# Patient Record
Sex: FEMALE | Race: WHITE | Employment: PART TIME | URBAN - NONMETROPOLITAN AREA
[De-identification: names, ages, dates, MRNs, and addresses within clinical notes are randomized per-mention and may not be internally consistent; named-entity substitution may affect disease eponyms.]

---
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
## 2021-03-23 ENCOUNTER — HOSPITAL ENCOUNTER (OUTPATIENT)
Age: 21
Discharge: HOME | End: 2021-03-23

## 2021-04-27 ENCOUNTER — HOSPITAL ENCOUNTER (EMERGENCY)
Age: 21
Discharge: HOME | End: 2021-04-27
Payer: COMMERCIAL

## 2021-04-27 VITALS
DIASTOLIC BLOOD PRESSURE: 77 MMHG | HEART RATE: 130 BPM | OXYGEN SATURATION: 96 % | TEMPERATURE: 101.6 F | RESPIRATION RATE: 16 BRPM | SYSTOLIC BLOOD PRESSURE: 137 MMHG | WEIGHT: 196 LBS

## 2021-04-27 DIAGNOSIS — Z20.822 SUSPECTED COVID-19 VIRUS INFECTION: Primary | ICD-10-CM

## 2021-04-27 LAB
FLU A ANTIGEN: NEGATIVE
INFLUENZA B AG, EIA: NEGATIVE

## 2021-04-27 PROCEDURE — 99213 OFFICE O/P EST LOW 20 MIN: CPT

## 2021-04-27 PROCEDURE — 87804 INFLUENZA ASSAY W/OPTIC: CPT

## 2021-04-27 PROCEDURE — 99202 OFFICE O/P NEW SF 15 MIN: CPT | Performed by: NURSE PRACTITIONER

## 2021-04-27 RX ORDER — NORGESTIMATE AND ETHINYL ESTRADIOL 0.25-0.035
1 KIT ORAL DAILY
COMMUNITY

## 2021-04-27 RX ORDER — ASCORBIC ACID/MULTIVIT-MIN 1000 MG
EFFERVESCENT POWDER IN PACKET ORAL
Refills: 0 | COMMUNITY
Start: 2021-04-27 | End: 2021-12-30 | Stop reason: ALTCHOICE

## 2021-04-27 RX ORDER — OMEGA-3 FATTY ACIDS/FISH OIL 300-1000MG
1 CAPSULE ORAL
COMMUNITY
End: 2021-12-30

## 2021-04-27 RX ORDER — CALCIUM CARBONATE 260MG(650)
TABLET,CHEWABLE ORAL
Refills: 0 | COMMUNITY
Start: 2021-04-27 | End: 2021-12-30 | Stop reason: ALTCHOICE

## 2021-04-27 RX ORDER — TOPIRAMATE 25 MG/1
25 TABLET ORAL DAILY
COMMUNITY

## 2021-04-27 RX ORDER — ACETAMINOPHEN 325 MG/1
650 TABLET ORAL EVERY 6 HOURS PRN
COMMUNITY

## 2021-04-27 RX ORDER — CITALOPRAM 10 MG/1
10 TABLET ORAL DAILY
COMMUNITY

## 2021-04-27 ASSESSMENT — ENCOUNTER SYMPTOMS
RHINORRHEA: 0
NAUSEA: 1
SORE THROAT: 0
VOMITING: 0
COUGH: 0
DIARRHEA: 0

## 2021-04-27 ASSESSMENT — PAIN DESCRIPTION - PAIN TYPE: TYPE: ACUTE PAIN

## 2021-04-27 ASSESSMENT — PAIN DESCRIPTION - PROGRESSION: CLINICAL_PROGRESSION: GRADUALLY WORSENING

## 2021-04-27 ASSESSMENT — PAIN SCALES - GENERAL: PAINLEVEL_OUTOF10: 3

## 2021-04-27 ASSESSMENT — PAIN DESCRIPTION - ONSET: ONSET: PROGRESSIVE

## 2021-04-27 NOTE — ED PROVIDER NOTES
650 mg by mouth every 6 hours as needed for Pain    CITALOPRAM (CELEXA) 10 MG TABLET    Take 10 mg by mouth daily    IBUPROFEN (ADVIL) 200 MG CAPS    Take 1 capsule by mouth    NORGESTIMATE-ETHINYL ESTRADIOL (KARLA) 0.25-35 MG-MCG PER TABLET    Take 1 tablet by mouth daily    TOPIRAMATE (TOPAMAX) 25 MG TABLET    Take 25 mg by mouth daily       ALLERGIES     Patient is is allergic to augmentin [amoxicillin-pot clavulanate]. FAMILY HISTORY     Patient's family history is not on file. SOCIAL HISTORY     Patient  reports that she has never smoked. She has never used smokeless tobacco. She reports current alcohol use. She reports that she does not use drugs. PHYSICAL EXAM     ED TRIAGE VITALS  BP: 137/77, Temp: 101.6 °F (38.7 °C), Pulse: 130, Resp: 16, SpO2: 96 %  Physical Exam  Vitals signs and nursing note reviewed. Constitutional:       General: She is awake. She is not in acute distress. Appearance: Normal appearance. She is well-developed and well-groomed. She is not ill-appearing, toxic-appearing or diaphoretic. Interventions: She is not intubated. HENT:      Head: Normocephalic and atraumatic. Right Ear: Hearing, tympanic membrane, ear canal and external ear normal. There is no impacted cerumen. Left Ear: Hearing, tympanic membrane, ear canal and external ear normal. There is no impacted cerumen. Nose: Nose normal. No congestion or rhinorrhea. Mouth/Throat:      Lips: Pink. No lesions. Mouth: Mucous membranes are moist.      Pharynx: Oropharynx is clear. Uvula midline. No pharyngeal swelling, oropharyngeal exudate, posterior oropharyngeal erythema or uvula swelling. Tonsils: No tonsillar exudate or tonsillar abscesses. Eyes:      Extraocular Movements: Extraocular movements intact. Conjunctiva/sclera: Conjunctivae normal.   Neck:      Musculoskeletal: Normal range of motion. Cardiovascular:      Rate and Rhythm: Tachycardia present.    Pulmonary: Effort: Pulmonary effort is normal. No tachypnea, bradypnea, accessory muscle usage, prolonged expiration, respiratory distress or retractions. She is not intubated. Breath sounds: No stridor, decreased air movement or transmitted upper airway sounds. No decreased breath sounds, wheezing, rhonchi or rales. Musculoskeletal: Normal range of motion. Skin:     General: Skin is warm. Neurological:      General: No focal deficit present. Mental Status: She is alert and oriented to person, place, and time. Psychiatric:         Mood and Affect: Mood normal.         Behavior: Behavior normal. Behavior is cooperative. Thought Content: Thought content normal.         Judgment: Judgment normal.         DIAGNOSTIC RESULTS   Labs:  Results for orders placed or performed during the hospital encounter of 04/27/21   Rapid influenza A/B antigens   Result Value Ref Range    Flu A Antigen Negative NEGATIVE    Influenza B Ag, EIA Negative NEGATIVE       IMAGING:  No orders to display     URGENT CARE COURSE:     Vitals:    04/27/21 1515   BP: 137/77   Pulse: 130   Resp: 16   Temp: 101.6 °F (38.7 °C)   TempSrc: Oral   SpO2: 96%   Weight: 196 lb (88.9 kg)       Medications - No data to display  PROCEDURES:  None  FINAL IMPRESSION      1. Suspected COVID-19 virus infection        DISPOSITION/PLAN   Decision To Discharge     The patient was advised to drink plenty of fluids. They may take Tylenol for fever or body aches. Take prescribed medication as directed. They may also take OTC antihistamines/ decongestant as needed. Patient is to self isolate until COVID-19 test result is known. If applicable, work/school note has been provided to be off until COVID-19 test results. Follow with family provider for further work/school note needs. May take over-the-counter supplements daily if no contraindications:  Multivitamin/mineral, vitamin C, vitamin D, and B Complex as directed on bottles.   Pt is advised to go to ER if symptoms worsen, new symptoms develop, high fever >102, vomiting, breathing difficulty, lethargy, chest pain or shortness of breath Dial 911. The patient or patient's representative is agreeable to the treatment plan they're advised to follow-up with her primary care provider in one week for reevaluation.     PATIENT REFERRED TO:  [facility]  [address] [facility]. [address]  Call today  [phone] for appointment    [facility]  [place] [id number]  Go today  If symptoms worsen    DISCHARGE MEDICATIONS:  New Prescriptions    TOMMY, ZINGIBER OFFICINALIS, (ANTI-NAUSEA TOMMY) GUM    Follow package directions    MULTIPLE VITAMINS-MINERALS (EMERGEN-C VITAMIN C) PACK    Follow package directions    ZINC 10 MG LOZG    Follow package directions     Current Discharge Medication List          WOODROW Perdomo CNP, APRN - CNP  04/27/21 7555

## 2021-04-28 ENCOUNTER — HOSPITAL ENCOUNTER (EMERGENCY)
Age: 21
Discharge: HOME | End: 2021-04-28
Attending: EMERGENCY MEDICINE
Payer: COMMERCIAL

## 2021-04-28 ENCOUNTER — CARE COORDINATION (OUTPATIENT)
Dept: CARE COORDINATION | Age: 21
End: 2021-04-28

## 2021-04-28 ENCOUNTER — APPOINTMENT (OUTPATIENT)
Dept: GENERAL RADIOLOGY | Age: 21
End: 2021-04-28
Payer: COMMERCIAL

## 2021-04-28 VITALS
HEART RATE: 83 BPM | OXYGEN SATURATION: 97 % | RESPIRATION RATE: 14 BRPM | TEMPERATURE: 98.3 F | SYSTOLIC BLOOD PRESSURE: 124 MMHG | DIASTOLIC BLOOD PRESSURE: 68 MMHG

## 2021-04-28 DIAGNOSIS — E86.0 DEHYDRATION: ICD-10-CM

## 2021-04-28 DIAGNOSIS — B34.9 VIRAL ILLNESS: Primary | ICD-10-CM

## 2021-04-28 LAB
BACTERIA: ABNORMAL /HPF
BILIRUBIN URINE: ABNORMAL
BLOOD, URINE: NEGATIVE
CASTS 2: PRESENT /LPF
CASTS UA: ABNORMAL /LPF
CHARACTER, URINE: ABNORMAL
COLOR: ABNORMAL
CRYSTALS, UA: ABNORMAL
EPITHELIAL CELLS, UA: ABNORMAL /HPF
GLUCOSE URINE: NEGATIVE MG/DL
GROUP A STREP CULTURE, REFLEX: NEGATIVE
ICTOTEST: NEGATIVE
KETONES, URINE: 80
LEUKOCYTE ESTERASE, URINE: NEGATIVE
MISCELLANEOUS 2: ABNORMAL
NITRITE, URINE: NEGATIVE
PH UA: 6 (ref 5–9)
PREGNANCY, URINE: NEGATIVE
PROTEIN UA: 100
RBC URINE: ABNORMAL /HPF
REFLEX THROAT C + S: NORMAL
RENAL EPITHELIAL, UA: PRESENT
SPECIFIC GRAVITY, URINE: > 1.03 (ref 1–1.03)
UROBILINOGEN, URINE: 1 EU/DL (ref 0–1)
WBC UA: ABNORMAL /HPF
YEAST: ABNORMAL

## 2021-04-28 PROCEDURE — 99281 EMR DPT VST MAYX REQ PHY/QHP: CPT

## 2021-04-28 PROCEDURE — 87070 CULTURE OTHR SPECIMN AEROBIC: CPT

## 2021-04-28 PROCEDURE — 71046 X-RAY EXAM CHEST 2 VIEWS: CPT

## 2021-04-28 PROCEDURE — 81001 URINALYSIS AUTO W/SCOPE: CPT

## 2021-04-28 PROCEDURE — 87086 URINE CULTURE/COLONY COUNT: CPT

## 2021-04-28 PROCEDURE — 87880 STREP A ASSAY W/OPTIC: CPT

## 2021-04-28 PROCEDURE — 81025 URINE PREGNANCY TEST: CPT

## 2021-04-28 ASSESSMENT — ENCOUNTER SYMPTOMS
SHORTNESS OF BREATH: 0
COUGH: 0
CHEST TIGHTNESS: 0
BACK PAIN: 0
PHOTOPHOBIA: 0
EYE REDNESS: 0
EYE PAIN: 0
EYE DISCHARGE: 0
ABDOMINAL DISTENTION: 0
ABDOMINAL PAIN: 0
CONSTIPATION: 0
SORE THROAT: 0
DIARRHEA: 0
RHINORRHEA: 0
STRIDOR: 0
EYE ITCHING: 0
NAUSEA: 0
WHEEZING: 0
VOMITING: 0

## 2021-04-28 NOTE — ED PROVIDER NOTES
[address] ENCOUNTER      PATIENT NAME: Clemente Yanes  MRN: [id number]  : 2000  ELENA: 2021  PROVIDER: Brook Weston MD      CHIEF COMPLAINT       Chief Complaint   Patient presents with    Fever       Nurses Notes reviewed and I agree except as noted in the HPI. HISTORY OF PRESENT ILLNESS    Clemente Yanes is a 24 y.o. female who presents to Emergency Department with Fever    80-year-old female who is healthy presents with fever for the last 3 days. Highest temperature 103-104 F. Patient was seen at urgent care center yesterday, flu and Covid-19 swabs were negative. Patient today ran fever around 104, she took ibuprofen and Tylenol at home. She is currently afebrile. She feels fatigued. Otherwise no cough, no chest pain, no shortness of breath. No abdominal pain. No nausea, no vomiting, no urinary symptoms. This HPI was provided by the patient. REVIEW OF SYSTEMS     Review of Systems   Constitutional: Positive for fever. Negative for activity change, appetite change, chills, fatigue and unexpected weight change. HENT: Negative for congestion, ear discharge, ear pain, hearing loss, nosebleeds, rhinorrhea and sore throat. Eyes: Negative for photophobia, pain, discharge, redness and itching. Respiratory: Negative for cough, chest tightness, shortness of breath, wheezing and stridor. Cardiovascular: Negative for chest pain, palpitations and leg swelling. Gastrointestinal: Negative for abdominal distention, abdominal pain, constipation, diarrhea, nausea and vomiting. Endocrine: Negative for cold intolerance, heat intolerance, polydipsia and polyphagia. Genitourinary: Negative for dysuria, flank pain, frequency and hematuria. Musculoskeletal: Negative for arthralgias, back pain, gait problem, myalgias, neck pain and neck stiffness. Skin: Negative for pallor, rash and wound.    Allergic/Immunologic: Negative for environmental allergies and food allergies. Neurological: Negative for dizziness, tremors, syncope, weakness and headaches. Psychiatric/Behavioral: Negative for agitation, behavioral problems, confusion, self-injury, sleep disturbance and suicidal ideas. PAST MEDICAL HISTORY     Past Medical History:   Diagnosis Date    Asthma     Pneumonia        SURGICAL HISTORY       Past Surgical History:   Procedure Laterality Date    APPENDECTOMY         CURRENT MEDICATIONS       Previous Medications    ACETAMINOPHEN (TYLENOL) 325 MG TABLET    Take 650 mg by mouth every 6 hours as needed for Pain    CITALOPRAM (CELEXA) 10 MG TABLET    Take 10 mg by mouth daily    GINGER, ZINGIBER OFFICINALIS, (ANTI-NAUSEA GINGER) GUM    Follow package directions    IBUPROFEN (ADVIL) 200 MG CAPS    Take 1 capsule by mouth    MULTIPLE VITAMINS-MINERALS (EMERGEN-C VITAMIN C) PACK    Follow package directions    NORGESTIMATE-ETHINYL ESTRADIOL (KARLA) 0.25-35 MG-MCG PER TABLET    Take 1 tablet by mouth daily    TOPIRAMATE (TOPAMAX) 25 MG TABLET    Take 25 mg by mouth daily    ZINC 10 MG LOZG    Follow package directions       ALLERGIES     Augmentin [amoxicillin-pot clavulanate]    FAMILY HISTORY     has no family status information on file. family history is not on file. SOCIAL HISTORY      reports that she has never smoked. She has never used smokeless tobacco. She reports current alcohol use. She reports that she does not use drugs. PHYSICAL EXAM     INITIAL VITALS:    oral temperature is 98.3 °F (36.8 °C). Her blood pressure is 124/68 and her pulse is 83. Her respiration is 14 and oxygen saturation is 97%. Physical Exam  Vitals signs and nursing note reviewed. Constitutional:       Appearance: She is well-developed. She is not diaphoretic. HENT:      Head: Normocephalic and atraumatic. Nose: Nose normal.   Eyes:      General: No scleral icterus. Right eye: No discharge. Left eye: No discharge. supportive care including rest drinking of water alternating Tylenol ibuprofen as needed and close PCP follow-up. Patient discharged in stable conditions. CRITICAL CARE:   None    CONSULTS:  None    PROCEDURES:  None    RE-EXAMINATION AND RE-EVALUATION   Stable and feeling better. VITALS IN ED  Vitals:    04/28/21 1513 04/28/21 1515   BP:  124/68   Pulse:  83   Resp:  14   Temp: 98.3 °F (36.8 °C)    TempSrc: Oral    SpO2:  97%       FINAL IMPRESSION      1. Viral illness    2.  Dehydration          DISPOSITION/PLAN   Discharge home    PATIENT REFERRED TO:  Babs Carey  [address] [facility] [address]  [phone]    In 3 days  ED discharge follow-up      DISCHARGE MEDICATIONS:  New Prescriptions    No medications on file       (Please note that portions of this note were completed with a voice recognition program.  Efforts were made to edit the dictations but occasionally words aremis-transcribed.)    MD Galdys Frost MD  04/28/21 7178

## 2021-04-28 NOTE — CARE COORDINATION
Patient contacted regarding recent visit for viral symptoms.  contacted the patient by telephone to perform post discharge call. Verified name and  with patient as identifiers. Provided introduction to self, and reason for call due to viral symptoms of infection and/or exposure to COVID-19. Call within 2 business days of discharge: Yes       Patient presented to emergency department/flu clinic with complaints of viral symptoms/exposure to COVID. Patient reports symptoms are the same. Due to no new or worsening symptoms the RN CTN/SAMINA was not notified for escalation. Discussed exposure protocols and quarantine with CDC Guidelines What To Do If You Are Sick    Patient was given an opportunity for questions and concerns. Stay home except to get medical care    Separate yourself from other people and animals in your home    Call ahead before visiting your doctor    Wear a facemask    Cover your coughs and sneezes    Clean your hands often    Avoid sharing personal household items    Clean all high-touch surfaces everyday    Monitor your symptoms  Seek prompt medical attention if your illness is worsening (e.g., difficulty breathing). Before seeking care, call your healthcare provider and tell them that you have, or are being evaluated for, COVID-19. Put on a facemask before you enter the facility. These steps will help the healthcare provider's office to keep other people in the office or waiting room from getting infected or exposed. Ask your healthcare provider to call the local or Critical access hospital health department. Persons who are placed under If you have a medical emergency and need to call 911, notify the dispatch personnel that you have, or are being evaluated for COVID-19. If possible, put on a facemask before emergency medical services arrive.     The patient agrees to contact the [facility] exposure line [phone], local health department [place] [facility] of [facility]: ([phone]) and PCP

## 2021-04-28 NOTE — ED TRIAGE NOTES
Patient presents for concerns of fever. States she took 400mg of ibuprofen at 8am this morning and 500mg of tylenol at 130pm. States her thermometer reached 105. No fever noted at this time.

## 2021-04-29 ENCOUNTER — CARE COORDINATION (OUTPATIENT)
Dept: CARE COORDINATION | Age: 21
End: 2021-04-29

## 2021-04-29 LAB
ORGANISM: ABNORMAL
URINE CULTURE REFLEX: ABNORMAL

## 2021-04-29 NOTE — CARE COORDINATION
Attempted to reach patient for covid-19 f/u s/p recent ED visit for c/o fever and ongoing symptoms. COVID-19 testing was not completed. Patient previously called for COVID-19 education and enrolled in LOOP program.  Patient was not available at the time of my call and no voicemail option available. No further f/u planned at this time.

## 2021-04-30 ENCOUNTER — HOSPITAL ENCOUNTER (EMERGENCY)
Age: 21
Discharge: HOME | End: 2021-04-30
Attending: EMERGENCY MEDICINE
Payer: COMMERCIAL

## 2021-04-30 VITALS
HEIGHT: 66 IN | HEART RATE: 108 BPM | WEIGHT: 190 LBS | RESPIRATION RATE: 18 BRPM | OXYGEN SATURATION: 97 % | DIASTOLIC BLOOD PRESSURE: 67 MMHG | BODY MASS INDEX: 30.53 KG/M2 | SYSTOLIC BLOOD PRESSURE: 111 MMHG | TEMPERATURE: 99.1 F

## 2021-04-30 DIAGNOSIS — B09 VIRAL EXANTHEM: Primary | ICD-10-CM

## 2021-04-30 DIAGNOSIS — R11.0 NAUSEA: ICD-10-CM

## 2021-04-30 DIAGNOSIS — B34.9 VIRAL ILLNESS: ICD-10-CM

## 2021-04-30 LAB
ANION GAP SERPL CALCULATED.3IONS-SCNC: 11 MEQ/L (ref 8–16)
BUN BLDV-MCNC: 10 MG/DL (ref 7–22)
CALCIUM SERPL-MCNC: 8.6 MG/DL (ref 8.5–10.5)
CHLORIDE BLD-SCNC: 100 MEQ/L (ref 98–111)
CO2: 24 MEQ/L (ref 23–33)
CREAT SERPL-MCNC: 0.9 MG/DL (ref 0.4–1.2)
GFR SERPL CREATININE-BSD FRML MDRD: 79 ML/MIN/1.73M2
GLUCOSE BLD-MCNC: 106 MG/DL (ref 70–108)
HETEROPHILE ANTIBODIES: NEGATIVE
OSMOLALITY CALCULATION: 269.6 MOSMOL/KG (ref 275–300)
POTASSIUM SERPL-SCNC: 3.4 MEQ/L (ref 3.5–5.2)
SARS-COV-2, NAAT: NOT DETECTED
SCAN OF BLOOD SMEAR: NORMAL
SODIUM BLD-SCNC: 135 MEQ/L (ref 135–145)
THROAT/NOSE CULTURE: NORMAL

## 2021-04-30 PROCEDURE — 86308 HETEROPHILE ANTIBODY SCREEN: CPT

## 2021-04-30 PROCEDURE — 36415 COLL VENOUS BLD VENIPUNCTURE: CPT

## 2021-04-30 PROCEDURE — 99282 EMERGENCY DEPT VISIT SF MDM: CPT

## 2021-04-30 PROCEDURE — 80048 BASIC METABOLIC PNL TOTAL CA: CPT

## 2021-04-30 PROCEDURE — 2580000003 HC RX 258: Performed by: STUDENT IN AN ORGANIZED HEALTH CARE EDUCATION/TRAINING PROGRAM

## 2021-04-30 PROCEDURE — 87635 SARS-COV-2 COVID-19 AMP PRB: CPT

## 2021-04-30 PROCEDURE — 96360 HYDRATION IV INFUSION INIT: CPT

## 2021-04-30 PROCEDURE — 85025 COMPLETE CBC W/AUTO DIFF WBC: CPT

## 2021-04-30 RX ORDER — ONDANSETRON 4 MG/1
4 TABLET, ORALLY DISINTEGRATING ORAL ONCE
Status: DISCONTINUED | OUTPATIENT
Start: 2021-04-30 | End: 2021-04-30

## 2021-04-30 RX ORDER — 0.9 % SODIUM CHLORIDE 0.9 %
1000 INTRAVENOUS SOLUTION INTRAVENOUS ONCE
Status: COMPLETED | OUTPATIENT
Start: 2021-04-30 | End: 2021-04-30

## 2021-04-30 RX ORDER — ONDANSETRON 4 MG/1
4 TABLET, ORALLY DISINTEGRATING ORAL 3 TIMES DAILY PRN
Qty: 21 TABLET | Refills: 0 | Status: SHIPPED | OUTPATIENT
Start: 2021-04-30 | End: 2021-12-30 | Stop reason: ALTCHOICE

## 2021-04-30 RX ADMIN — SODIUM CHLORIDE 1000 ML: 9 INJECTION, SOLUTION INTRAVENOUS at 17:37

## 2021-04-30 ASSESSMENT — ENCOUNTER SYMPTOMS
RHINORRHEA: 0
NAUSEA: 1
CHEST TIGHTNESS: 0
SINUS PRESSURE: 0
VOMITING: 1
CONSTIPATION: 0
DIARRHEA: 1
SHORTNESS OF BREATH: 0
ABDOMINAL DISTENTION: 0
EYE PAIN: 0
SINUS PAIN: 0
EYE ITCHING: 0

## 2021-04-30 NOTE — ED PROVIDER NOTES
[address] [facility] [address]        Pt Name: Nydia May  MRN: [id number]  [place] 2000  Date of evaluation: 4/30/2021  Treating Resident Physician: Guero Armenta DO  Supervising Physician: [address] [facility]       Chief Complaint   Patient presents with    Fever    Rash     History obtained from the patient. HISTORY OF PRESENT ILLNESS    HPI  Nydia May is a 24 y.o. female who presents to the emergency department for evaluation of approximately 1 week of high fevers. Seen in urgent care and this emergency department for viral illness. Return secondary to persistent fevers T-max 104. New abdominal pain, labs that are nonbloody nonbilious vomiting and development of rash on her abdomen. PCP wanted her evaluated to make sure she does not have meningitis. Patient is also very nervous about her exams that are this month. She is fully vaccinated college student. Denies sore throat, current nausea, abdominal pain, difficulty urinating or changes in vaginal discharge. No chest pain or shortness of breath. The patient has no other acute complaints at this time. REVIEW OF SYSTEMS   Review of Systems   Constitutional: Positive for fever. Negative for activity change, chills and diaphoresis. HENT: Positive for ear pain. Negative for congestion, rhinorrhea, sinus pressure and sinus pain. Eyes: Negative for pain and itching. Respiratory: Negative for chest tightness and shortness of breath. Cardiovascular: Negative for chest pain and palpitations. Gastrointestinal: Positive for diarrhea, nausea and vomiting. Negative for abdominal distention and constipation. Genitourinary: Negative for difficulty urinating and dysuria. Musculoskeletal: Negative for neck pain and neck stiffness. Skin: Positive for rash. Negative for wound. Neurological: Negative for dizziness, weakness, light-headedness and numbness. present. Neurological:      General: No focal deficit present. Mental Status: She is alert and oriented to person, place, and time. MEDICAL DECISION MAKING   Initial Assessment:   1. Pleasant 44-year-old female with history of recent viral illness. New onset of symptoms that include nausea, vomiting, rash. Concern for worsening symptoms. Continued fever controlled with NSAID and Tylenol. Feels dry dehydrated. Vital signs stable with mild tachycardia, no fever currently, no hypoxia. No hypotension. Viral exanthem covering the trunk and scattered aspects of the upper extremities. No petechia or purpura  Plan:    CBC, BMP to check for electrolyte abnormality or white count/or RBC abnormality. Monospot. Recheck Covid. IV hydration, nausea control Zofran. Follow-up with family medicine resident clinic. Patient agreeable this plan to discharge home with close outpatient follow-up.     ED RESULTS   Laboratory results:  Labs Reviewed   CBC WITH AUTO DIFFERENTIAL - Abnormal; Notable for the following components:       Result Value    WBC 3.9 (*)     RDW-SD 45.1 (*)     All other components within normal limits   BASIC METABOLIC PANEL - Abnormal; Notable for the following components:    Potassium 3.4 (*)     All other components within normal limits   GLOMERULAR FILTRATION RATE, ESTIMATED - Abnormal; Notable for the following components:    Est, Glom Filt Rate 79 (*)     All other components within normal limits   OSMOLALITY - Abnormal; Notable for the following components:    Osmolality Calc 269.6 (*)     All other components within normal limits   COVID-19, RAPID   MONONUCLEOSIS SCREEN   ANION GAP   SCAN OF BLOOD SMEAR       Radiologic studies results:  No orders to display       ED Medications administered this visit:   Medications   0.9 % sodium chloride bolus (1,000 mLs Intravenous New Bag 4/30/21 1524)         ED COURSE          Strict return precautions and follow up instructions were

## 2021-04-30 NOTE — ED PROVIDER NOTES

## 2021-04-30 NOTE — ED NOTES
Pt presents to the ed with c/o rash and fever. PT states that she was seen here earlier in the week and sent home, patient has not seen any relief. Pt now presenting with a generalized rash. Last took tylenol for fever at 1530 for 102 fever.       Conroy [place], [place]  96/09/79 7880

## 2021-05-01 LAB
ATYPICAL LYMPHOCYTES: ABNORMAL %
BASOPHILS # BLD: 0.3 %
BASOPHILS ABSOLUTE: 0 THOU/MM3 (ref 0–0.1)
DIFFERENTIAL TYPE: ABNORMAL
EOSINOPHIL # BLD: 0 %
EOSINOPHILS ABSOLUTE: 0 THOU/MM3 (ref 0–0.4)
ERYTHROCYTE [DISTWIDTH] IN BLOOD BY AUTOMATED COUNT: 13.7 % (ref 11.5–14.5)
ERYTHROCYTE [DISTWIDTH] IN BLOOD BY AUTOMATED COUNT: 45.1 FL (ref 35–45)
HCT VFR BLD CALC: 41 % (ref 37–47)
HEMOGLOBIN: 13.5 GM/DL (ref 12–16)
IMMATURE GRANS (ABS): 0.01 THOU/MM3 (ref 0–0.07)
IMMATURE GRANULOCYTES: 0.3 %
LYMPHOCYTES # BLD: 35.8 %
LYMPHOCYTES ABSOLUTE: 1.4 THOU/MM3 (ref 1–4.8)
MCH RBC QN AUTO: 29.6 PG (ref 26–33)
MCHC RBC AUTO-ENTMCNC: 32.9 GM/DL (ref 32.2–35.5)
MCV RBC AUTO: 89.9 FL (ref 81–99)
MONOCYTES # BLD: 2.3 %
MONOCYTES ABSOLUTE: 0.1 THOU/MM3 (ref 0.4–1.3)
NUCLEATED RED BLOOD CELLS: 0 /100 WBC
PATHOLOGIST REVIEW: ABNORMAL
PLATELET # BLD: 167 THOU/MM3 (ref 130–400)
PMV BLD AUTO: 10.4 FL (ref 9.4–12.4)
RBC # BLD: 4.56 MILL/MM3 (ref 4.2–5.4)
SEG NEUTROPHILS: 61.3 %
SEGMENTED NEUTROPHILS ABSOLUTE COUNT: 2.4 THOU/MM3 (ref 1.8–7.7)
WBC # BLD: 3.9 THOU/MM3 (ref 4.8–10.8)

## 2021-09-23 ENCOUNTER — NURSE ONLY (OUTPATIENT)
Dept: LAB | Age: 21
End: 2021-09-23

## 2021-10-16 ENCOUNTER — HOSPITAL ENCOUNTER (EMERGENCY)
Age: 21
Discharge: HOME | End: 2021-10-16
Payer: COMMERCIAL

## 2021-10-16 VITALS
DIASTOLIC BLOOD PRESSURE: 62 MMHG | HEIGHT: 66 IN | RESPIRATION RATE: 18 BRPM | HEART RATE: 82 BPM | OXYGEN SATURATION: 98 % | SYSTOLIC BLOOD PRESSURE: 132 MMHG | WEIGHT: 195 LBS | BODY MASS INDEX: 31.34 KG/M2 | TEMPERATURE: 97.1 F

## 2021-10-16 DIAGNOSIS — J01.00 ACUTE MAXILLARY SINUSITIS, RECURRENCE NOT SPECIFIED: Primary | ICD-10-CM

## 2021-10-16 PROCEDURE — 99213 OFFICE O/P EST LOW 20 MIN: CPT

## 2021-10-16 PROCEDURE — 99213 OFFICE O/P EST LOW 20 MIN: CPT | Performed by: NURSE PRACTITIONER

## 2021-10-16 RX ORDER — CEFDINIR 300 MG/1
300 CAPSULE ORAL 2 TIMES DAILY
Qty: 14 CAPSULE | Refills: 0 | Status: SHIPPED | OUTPATIENT
Start: 2021-10-16 | End: 2021-10-16 | Stop reason: SDUPTHER

## 2021-10-16 RX ORDER — PSEUDOEPHEDRINE HYDROCHLORIDE 30 MG/1
30 TABLET ORAL EVERY 4 HOURS PRN
Qty: 56 TABLET | Refills: 0 | COMMUNITY
Start: 2021-10-16 | End: 2021-10-16 | Stop reason: SDUPTHER

## 2021-10-16 RX ORDER — PSEUDOEPHEDRINE HYDROCHLORIDE 30 MG/1
30 TABLET ORAL EVERY 4 HOURS PRN
Qty: 56 TABLET | Refills: 0 | COMMUNITY
Start: 2021-10-16 | End: 2021-10-30

## 2021-10-16 RX ORDER — CEFDINIR 300 MG/1
300 CAPSULE ORAL 2 TIMES DAILY
Qty: 14 CAPSULE | Refills: 0 | Status: SHIPPED | OUTPATIENT
Start: 2021-10-16 | End: 2021-10-23

## 2021-10-16 ASSESSMENT — ENCOUNTER SYMPTOMS
CHEST TIGHTNESS: 0
SWOLLEN GLANDS: 0
STRIDOR: 0
APNEA: 0
SHORTNESS OF BREATH: 0
SINUS PRESSURE: 1
RHINORRHEA: 1
WHEEZING: 0
CHOKING: 0
SINUS PAIN: 1
SORE THROAT: 0
COUGH: 1

## 2021-10-16 NOTE — ED PROVIDER NOTES
[place] [address]  Urgent Care Encounter      CHIEF COMPLAINT       Chief Complaint   Patient presents with    Cough    Otalgia       Nurses Notes reviewed and I agree except as noted in the HPI. HISTORY OFPRESENT ILLNESS   Jamal Owens is a 24 y.o. The history is provided by the patient. No  was used. URI  Presenting symptoms: congestion, cough, ear pain, fatigue and rhinorrhea    Presenting symptoms: no facial pain, no fever and no sore throat    Severity:  Moderate  Onset quality:  Gradual  Duration:  2 weeks  Timing:  Constant  Progression:  Unchanged  Chronicity:  New  Relieved by:  Nothing  Worsened by:  Nothing  Ineffective treatments:  OTC medications, hot fluids and rest  Associated symptoms: headaches and sinus pain    Associated symptoms: no arthralgias, no myalgias, no neck pain, no sneezing, no swollen glands and no wheezing    Risk factors: not elderly, no chronic cardiac disease, no chronic kidney disease, no chronic respiratory disease, no diabetes mellitus, no immunosuppression, no recent illness, no recent travel and no sick contacts        REVIEW OF SYSTEMS     Review of Systems   Constitutional: Positive for fatigue. Negative for activity change, appetite change, chills, diaphoresis and fever. HENT: Positive for congestion, ear pain, postnasal drip, rhinorrhea, sinus pressure and sinus pain. Negative for sneezing and sore throat. Respiratory: Positive for cough. Negative for apnea, choking, chest tightness, shortness of breath, wheezing and stridor. Cardiovascular: Negative for chest pain, palpitations and leg swelling. Musculoskeletal: Negative for arthralgias, myalgias and neck pain. Neurological: Positive for headaches. Negative for dizziness and light-headedness.        PAST MEDICAL HISTORY         Diagnosis Date    Asthma     Pneumonia        SURGICAL HISTORY     Patient  has a past surgical history that includes Appendectomy. CURRENT MEDICATIONS       Discharge Medication List as of 10/16/2021 10:49 AM      CONTINUE these medications which have NOT CHANGED    Details   ondansetron (ZOFRAN-ODT) 4 MG disintegrating tablet Take 1 tablet by mouth 3 times daily as needed for Nausea or Vomiting, Disp-21 tablet, R-0Normal      topiramate (TOPAMAX) 25 MG tablet Take 25 mg by mouth dailyHistorical Med      norgestimate-ethinyl estradiol (KARLA) 0.25-35 MG-MCG per tablet Take 1 tablet by mouth dailyHistorical Med      citalopram (CELEXA) 10 MG tablet Take 10 mg by mouth dailyHistorical Med      acetaminophen (TYLENOL) 325 MG tablet Take 650 mg by mouth every 6 hours as needed for PainHistorical Med      ibuprofen (ADVIL) 200 MG CAPS Take 1 capsule by mouthHistorical Med      Multiple Vitamins-Minerals (EMERGEN-C VITAMIN C) PACK Follow package directions, R-0OTC      Zinc 10 MG LOZG Follow package directions, R-0OTC      Ginger, Zingiber officinalis, (ANTI-NAUSEA GINGER) GUM Follow package directions, R-0OTC             ALLERGIES     Patient is is allergic to augmentin [amoxicillin-pot clavulanate]. FAMILY HISTORY     Patient's family history is not on file. SOCIAL HISTORY     Patient  reports that she has never smoked. She has never used smokeless tobacco. She reports current alcohol use. She reports that she does not use drugs. PHYSICAL EXAM     ED TRIAGE VITALS  BP: 132/62, Temp: 97.1 °F (36.2 °C), Pulse: 82, Resp: 18, SpO2: 98 %  Physical Exam  Vitals and nursing note reviewed. Constitutional:       General: She is awake. She is not in acute distress. Appearance: Normal appearance. She is normal weight. She is not ill-appearing, toxic-appearing or diaphoretic. HENT:      Head: Normocephalic and atraumatic. Right Ear: Hearing, tympanic membrane, ear canal and external ear normal.      Left Ear: Hearing, tympanic membrane, ear canal and external ear normal.      Nose:      Right Nostril: Occlusion present. Left Nostril: Occlusion present. Right Sinus: Maxillary sinus tenderness present. Left Sinus: Maxillary sinus tenderness present. Mouth/Throat:      Mouth: Mucous membranes are moist.   Eyes:      Extraocular Movements: Extraocular movements intact. Conjunctiva/sclera: Conjunctivae normal.   Pulmonary:      Effort: Pulmonary effort is normal.   Musculoskeletal:         General: Normal range of motion. Cervical back: Normal range of motion. Skin:     General: Skin is warm. Neurological:      General: No focal deficit present. Mental Status: She is alert and oriented to person, place, and time. Psychiatric:         Mood and Affect: Mood normal.         Behavior: Behavior normal. Behavior is cooperative. Thought Content: Thought content normal.         Judgment: Judgment normal.         DIAGNOSTIC RESULTS   Labs:No results found for this visit on 10/16/21. IMAGING:  No orders to display     URGENT CARE COURSE:     Vitals:    10/16/21 1030   BP: 132/62   Pulse: 82   Resp: 18   Temp: 97.1 °F (36.2 °C)   TempSrc: Tympanic   SpO2: 98%   Weight: 195 lb (88.5 kg)   Height: 5' 6\" (1.676 m)       Medications - No data to display  PROCEDURES:  None  FINAL IMPRESSION      1. Acute maxillary sinusitis, recurrence not specified        DISPOSITION/PLAN   Decision To Discharge    Drink lots of fluids  Take Motrin or Tylenol for headache  Humidification of air  Use nasal spray as directed  Take a nasal decongestant as directed  Monitor for any fever increased and sinus pain or pressure  Follow-up see her primary care provider in 48 hours  Or go to the emergency department for any changes or concerns.       PATIENT REFERRED TO:  Hudson Tsang  [address] [place] [address]  [phone]      As needed    DISCHARGE MEDICATIONS:  Discharge Medication List as of 10/16/2021 10:49 AM        Discharge Medication List as of 10/16/2021 10:49 AM      CONTINUE these medications which have CHANGED    Details   pseudoephedrine (SUDAFED) 30 MG tablet Take 1 tablet by mouth every 4 hours as needed for Congestion, Disp-56 tablet, R-0OT      cefdinir (OMNICEF) 300 MG capsule Take 1 capsule by mouth 2 times daily for 7 days, Disp-14 capsule, RSimone0Normal             WOODROW Ruiz CNP, APRN - CNP  10/16/21 1114

## 2021-10-16 NOTE — ED NOTES
Patient presents to [facility] with complaints of bilateral ear fullness and a cough since October 4th.  Patient reports being COVID swabbed last weekend with a negative test. Patient denies any pain     Candice Litten, RN  10/16/21 1037

## 2021-12-30 ENCOUNTER — HOSPITAL ENCOUNTER (EMERGENCY)
Age: 21
Discharge: HOME | End: 2021-12-30
Payer: COMMERCIAL

## 2021-12-30 VITALS
HEIGHT: 66 IN | TEMPERATURE: 98.8 F | OXYGEN SATURATION: 96 % | DIASTOLIC BLOOD PRESSURE: 93 MMHG | RESPIRATION RATE: 18 BRPM | WEIGHT: 195 LBS | SYSTOLIC BLOOD PRESSURE: 143 MMHG | BODY MASS INDEX: 31.34 KG/M2 | HEART RATE: 86 BPM

## 2021-12-30 DIAGNOSIS — R52 BODY ACHES: Primary | ICD-10-CM

## 2021-12-30 PROCEDURE — 87636 SARSCOV2 & INF A&B AMP PRB: CPT

## 2021-12-30 PROCEDURE — 99213 OFFICE O/P EST LOW 20 MIN: CPT | Performed by: NURSE PRACTITIONER

## 2021-12-30 PROCEDURE — 99213 OFFICE O/P EST LOW 20 MIN: CPT

## 2021-12-30 ASSESSMENT — PAIN SCALES - GENERAL: PAINLEVEL_OUTOF10: 3

## 2021-12-30 ASSESSMENT — PAIN DESCRIPTION - DESCRIPTORS: DESCRIPTORS: ACHING

## 2021-12-30 ASSESSMENT — PAIN DESCRIPTION - ONSET: ONSET: ON-GOING

## 2021-12-30 ASSESSMENT — PAIN DESCRIPTION - FREQUENCY: FREQUENCY: CONTINUOUS

## 2021-12-30 ASSESSMENT — PAIN DESCRIPTION - LOCATION: LOCATION: NECK

## 2021-12-30 ASSESSMENT — PAIN DESCRIPTION - PAIN TYPE: TYPE: ACUTE PAIN

## 2021-12-30 NOTE — Clinical Note
Krista Morrow was seen and treated in our emergency department on 12/30/2021. She may return to work on 12/31/2021. Covid test is pending. If you have any questions or concerns, please don't hesitate to call.       Dean Stevens, WOODROW - CNP

## 2021-12-31 LAB
INFLUENZA A: NOT DETECTED
INFLUENZA B: NOT DETECTED
SARS-COV-2 RNA, RT PCR: NOT DETECTED

## 2021-12-31 NOTE — ED NOTES
Pt discharged. Pt verbalized understanding of discharge instructions and scripts. Pt walked out per self in stable condition.      Rogelio Jo, DAVID  87/99/49 [id number]

## 2021-12-31 NOTE — ED TRIAGE NOTES
To room with request for covid test. Headache and body aches that started today[facility]

## 2022-01-03 ASSESSMENT — ENCOUNTER SYMPTOMS
COUGH: 0
EYE REDNESS: 0
NAUSEA: 0
VOMITING: 0
EYE DISCHARGE: 0
DIARRHEA: 0
SHORTNESS OF BREATH: 0
RHINORRHEA: 0
SORE THROAT: 0
TROUBLE SWALLOWING: 0

## 2022-01-03 NOTE — ED PROVIDER NOTES
[address] [facility] Encounter      CHIEFCOMPLAINT       Chief Complaint   Patient presents with    Generalized Body Aches       Nurses Notes reviewed and I agree except as noted in the HPI. HISTORY OF PRESENT ILLNESS   Ronnie Rock is a 24 y.o. female who presents for evaluation of COVID-19 symptoms. Symptoms began earlier today. She complains of generalized body aches, rates 3/10. Associated generalized headache. No fever or loss of taste/smell. No chest pain or shortness of breath. Patient exposed to COVID-19. REVIEW OF SYSTEMS     Review of Systems   Constitutional: Negative for chills, diaphoresis, fatigue and fever. HENT: Negative for congestion, ear pain, rhinorrhea, sore throat and trouble swallowing. Eyes: Negative for discharge and redness. Respiratory: Negative for cough and shortness of breath. Cardiovascular: Negative for chest pain. Gastrointestinal: Negative for diarrhea, nausea and vomiting. Genitourinary: Negative for decreased urine volume. Musculoskeletal: Positive for myalgias. Negative for neck pain and neck stiffness. Skin: Negative for rash. Neurological: Positive for headaches. Hematological: Negative for adenopathy. Psychiatric/Behavioral: Negative for sleep disturbance. PAST MEDICAL HISTORY         Diagnosis Date    Asthma     Pneumonia        SURGICAL HISTORY     Patient  has a past surgical history that includes Appendectomy.     CURRENT MEDICATIONS       Discharge Medication List as of 12/30/2021  8:05 PM      CONTINUE these medications which have NOT CHANGED    Details   topiramate (TOPAMAX) 25 MG tablet Take 25 mg by mouth dailyHistorical Med      citalopram (CELEXA) 10 MG tablet Take 10 mg by mouth dailyHistorical Med      acetaminophen (TYLENOL) 325 MG tablet Take 650 mg by mouth every 6 hours as needed for PainHistorical Med      norgestimate-ethinyl estradiol (KARLA) 0.25-35 MG-MCG per tablet Take 1 tablet by mouth dailyHistorical Med      Nikki, Zingiber officinalis, (ANTI-NAUSEA NIKKI) GUM Follow package directions, R-0OTC             ALLERGIES     Patient is is allergic to augmentin [amoxicillin-pot clavulanate]. FAMILY HISTORY     Patient'sfamily history is not on file. SOCIAL HISTORY     Patient  reports that she has never smoked. She has never used smokeless tobacco. She reports current alcohol use. She reports that she does not use drugs. PHYSICAL EXAM     ED TRIAGE VITALS  BP: (!) 143/93, Temp: 98.8 °F (37.1 °C), Pulse: 86, Resp: 18, SpO2: 96 %  Physical Exam  Vitals and nursing note reviewed. Constitutional:       General: She is not in acute distress. Appearance: Normal appearance. She is well-developed. She is not ill-appearing, toxic-appearing or diaphoretic. HENT:      Head: Normocephalic and atraumatic. Right Ear: Hearing, tympanic membrane, ear canal and external ear normal. No mastoid tenderness. No hemotympanum. Tympanic membrane is not perforated, erythematous or bulging. Left Ear: Hearing, tympanic membrane, ear canal and external ear normal. No mastoid tenderness. No hemotympanum. Tympanic membrane is not perforated, erythematous or bulging. Nose: Nose normal.      Mouth/Throat:      Mouth: Mucous membranes are moist.      Pharynx: Oropharynx is clear. Uvula midline. Tonsils: No tonsillar abscesses. Eyes:      General: No scleral icterus. Conjunctiva/sclera: Conjunctivae normal.      Right eye: Right conjunctiva is not injected. No hemorrhage. Left eye: Left conjunctiva is not injected. No hemorrhage. Neck:      Thyroid: No thyromegaly. Trachea: Trachea normal.   Cardiovascular:      Rate and Rhythm: Normal rate and regular rhythm. No extrasystoles are present. Chest Wall: PMI is not displaced. Heart sounds: Normal heart sounds. No murmur heard. No friction rub. No gallop.     Pulmonary:      Effort: Pulmonary effort is normal. No respiratory distress. Breath sounds: Normal breath sounds. Chest:   Breasts:      Right: No supraclavicular adenopathy. Left: No supraclavicular adenopathy. Musculoskeletal:      Cervical back: Normal range of motion and neck supple. Lymphadenopathy:      Head:      Right side of head: No submental, submandibular, tonsillar or occipital adenopathy. Left side of head: No submental, submandibular, tonsillar or occipital adenopathy. Cervical: No cervical adenopathy. Upper Body:      Right upper body: No supraclavicular adenopathy. Left upper body: No supraclavicular adenopathy. Skin:     General: Skin is warm and dry. Capillary Refill: Capillary refill takes less than 2 seconds. Coloration: Skin is not pale. Findings: No rash. Comments: Skin warm and dry to touch, no rashes noted on exposed surfaces. Neurological:      Mental Status: She is alert and oriented to person, place, and time. She is not disoriented. Psychiatric:         Mood and Affect: Mood normal.         Behavior: Behavior is cooperative. DIAGNOSTIC RESULTS   Labs:   Results for orders placed or performed during the hospital encounter of 12/30/21   COVID-19 & Influenza Combo    Specimen: Nasopharyngeal Swab   Result Value Ref Range    SARS-CoV-2 RNA, RT PCR NOT DETECTED NOT DETECTED    INFLUENZA A NOT DETECTED NOT DETECTED    INFLUENZA B NOT DETECTED NOT DETECTED       IMAGING:  No orders to display     URGENT CARE COURSE:     Vitals:    12/30/21 1959   BP: (!) 143/93   Pulse: 86   Resp: 18   Temp: 98.8 °F (37.1 °C)   TempSrc: Temporal   SpO2: 96%   Weight: 195 lb (88.5 kg)   Height: 5' 6\" (1.676 m)       Medications - No data to display  PROCEDURES:  None  FINALIMPRESSION      1. Body aches        DISPOSITION/PLAN   DISPOSITION Decision To Discharge 12/30/2021 08:04:43 PM  Covid/influenza negative. Over-the-counter medicine as needed. Follow-up as needed.   PATIENT REFERRED TO:  Po Yo  [address] [address]  [phone]    In 3 days  As needed, If symptoms worsen    DISCHARGE MEDICATIONS:  Discharge Medication List as of 12/30/2021  8:05 PM        Discharge Medication List as of 12/30/2021  8:05 PM          WOODROW Ferrer - WOODROW Smith - WHITNEY  01/03/22 5350

## 2025-06-04 LAB
CHOLEST SERPL-MCNC: 184 MG/DL (ref 0–199)
GLUCOSE SERPL-MCNC: 91 MG/DL (ref 70–99)
HDLC SERPL-MCNC: 38 MG/DL (ref 40–60)
LDLC SERPL CALC-MCNC: 130 MG/DL
TRIGL SERPL-MCNC: 81 MG/DL (ref 0–150)